# Patient Record
Sex: FEMALE | HISPANIC OR LATINO | URBAN - METROPOLITAN AREA
[De-identification: names, ages, dates, MRNs, and addresses within clinical notes are randomized per-mention and may not be internally consistent; named-entity substitution may affect disease eponyms.]

---

## 2019-06-12 ENCOUNTER — APPOINTMENT (RX ONLY)
Dept: URBAN - METROPOLITAN AREA CLINIC 37 | Facility: CLINIC | Age: 54
Setting detail: DERMATOLOGY
End: 2019-06-12

## 2019-06-12 DIAGNOSIS — L65.8 OTHER SPECIFIED NONSCARRING HAIR LOSS: ICD-10-CM

## 2019-06-12 DIAGNOSIS — L91.8 OTHER HYPERTROPHIC DISORDERS OF THE SKIN: ICD-10-CM

## 2019-06-12 DIAGNOSIS — D22 MELANOCYTIC NEVI: ICD-10-CM

## 2019-06-12 DIAGNOSIS — L82.1 OTHER SEBORRHEIC KERATOSIS: ICD-10-CM

## 2019-06-12 PROBLEM — D22.5 MELANOCYTIC NEVI OF TRUNK: Status: ACTIVE | Noted: 2019-06-12

## 2019-06-12 PROCEDURE — 99203 OFFICE O/P NEW LOW 30 MIN: CPT

## 2020-12-02 ENCOUNTER — APPOINTMENT (RX ONLY)
Dept: URBAN - METROPOLITAN AREA CLINIC 37 | Facility: CLINIC | Age: 55
Setting detail: DERMATOLOGY
End: 2020-12-02

## 2020-12-02 DIAGNOSIS — D18.0 HEMANGIOMA: ICD-10-CM

## 2020-12-02 DIAGNOSIS — L82.1 OTHER SEBORRHEIC KERATOSIS: ICD-10-CM

## 2020-12-02 DIAGNOSIS — L81.5 LEUKODERMA, NOT ELSEWHERE CLASSIFIED: ICD-10-CM

## 2020-12-02 DIAGNOSIS — D22 MELANOCYTIC NEVI: ICD-10-CM

## 2020-12-02 PROBLEM — D48.5 NEOPLASM OF UNCERTAIN BEHAVIOR OF SKIN: Status: ACTIVE | Noted: 2020-12-02

## 2020-12-02 PROBLEM — D22.5 MELANOCYTIC NEVI OF TRUNK: Status: ACTIVE | Noted: 2020-12-02

## 2020-12-02 PROBLEM — D18.01 HEMANGIOMA OF SKIN AND SUBCUTANEOUS TISSUE: Status: ACTIVE | Noted: 2020-12-02

## 2020-12-02 PROCEDURE — 11102 TANGNTL BX SKIN SINGLE LES: CPT

## 2020-12-02 PROCEDURE — ? COUNSELING

## 2020-12-02 PROCEDURE — ? BIOPSY BY SHAVE METHOD

## 2020-12-02 PROCEDURE — 99213 OFFICE O/P EST LOW 20 MIN: CPT | Mod: 25

## 2020-12-02 ASSESSMENT — LOCATION DETAILED DESCRIPTION DERM
LOCATION DETAILED: LEFT PROXIMAL PRETIBIAL REGION
LOCATION DETAILED: LEFT SUPERIOR MEDIAL UPPER BACK
LOCATION DETAILED: LEFT RIB CAGE
LOCATION DETAILED: RIGHT PROXIMAL PRETIBIAL REGION
LOCATION DETAILED: LEFT LATERAL TRAPEZIAL NECK
LOCATION DETAILED: RIGHT INFERIOR UPPER BACK
LOCATION DETAILED: SUBXIPHOID
LOCATION DETAILED: LEFT INFERIOR ANTERIOR NECK

## 2020-12-02 ASSESSMENT — LOCATION SIMPLE DESCRIPTION DERM
LOCATION SIMPLE: POSTERIOR NECK
LOCATION SIMPLE: LEFT UPPER BACK
LOCATION SIMPLE: ABDOMEN
LOCATION SIMPLE: LEFT ANTERIOR NECK
LOCATION SIMPLE: RIGHT UPPER BACK
LOCATION SIMPLE: RIGHT PRETIBIAL REGION
LOCATION SIMPLE: LEFT PRETIBIAL REGION

## 2020-12-02 ASSESSMENT — LOCATION ZONE DERM
LOCATION ZONE: TRUNK
LOCATION ZONE: NECK
LOCATION ZONE: LEG

## 2023-08-09 ENCOUNTER — APPOINTMENT (RX ONLY)
Dept: URBAN - METROPOLITAN AREA CLINIC 37 | Facility: CLINIC | Age: 58
Setting detail: DERMATOLOGY
End: 2023-08-09

## 2023-08-09 DIAGNOSIS — L82.1 OTHER SEBORRHEIC KERATOSIS: ICD-10-CM

## 2023-08-09 DIAGNOSIS — L81.4 OTHER MELANIN HYPERPIGMENTATION: ICD-10-CM

## 2023-08-09 DIAGNOSIS — L91.8 OTHER HYPERTROPHIC DISORDERS OF THE SKIN: ICD-10-CM

## 2023-08-09 PROCEDURE — ? DEFER

## 2023-08-09 PROCEDURE — ? COUNSELING

## 2023-08-09 PROCEDURE — 99213 OFFICE O/P EST LOW 20 MIN: CPT

## 2023-08-09 ASSESSMENT — LOCATION DETAILED DESCRIPTION DERM
LOCATION DETAILED: RIGHT CLAVICULAR NECK
LOCATION DETAILED: LEFT CLAVICULAR SKIN
LOCATION DETAILED: LEFT LATERAL TRAPEZIAL NECK
LOCATION DETAILED: LEFT POSTERIOR NECK
LOCATION DETAILED: RIGHT LATERAL TRAPEZIAL NECK

## 2023-08-09 ASSESSMENT — LOCATION SIMPLE DESCRIPTION DERM
LOCATION SIMPLE: RIGHT ANTERIOR NECK
LOCATION SIMPLE: POSTERIOR NECK
LOCATION SIMPLE: LEFT CLAVICULAR SKIN

## 2023-08-09 ASSESSMENT — LOCATION ZONE DERM
LOCATION ZONE: NECK
LOCATION ZONE: TRUNK

## 2023-08-09 NOTE — PROCEDURE: DEFER
Instructions (Optional): 50 skin tags per visit. LMX cream before visit
Introduction Text (Please End With A Colon): The following procedure was deferred:
Detail Level: Detailed
X Size Of Lesion In Cm (Optional): 0

## 2023-08-17 ENCOUNTER — APPOINTMENT (RX ONLY)
Dept: URBAN - METROPOLITAN AREA CLINIC 37 | Facility: CLINIC | Age: 58
Setting detail: DERMATOLOGY
End: 2023-08-17

## 2023-08-17 PROCEDURE — ? REASON FOR LEAVING

## 2023-08-24 ENCOUNTER — APPOINTMENT (RX ONLY)
Dept: URBAN - METROPOLITAN AREA CLINIC 37 | Facility: CLINIC | Age: 58
Setting detail: DERMATOLOGY
End: 2023-08-24

## 2023-08-24 DIAGNOSIS — L91.8 OTHER HYPERTROPHIC DISORDERS OF THE SKIN: ICD-10-CM

## 2023-08-24 PROCEDURE — ? BENIGN DESTRUCTION COSMETIC MULTI

## 2023-08-24 PROCEDURE — ? COUNSELING

## 2023-08-24 ASSESSMENT — LOCATION SIMPLE DESCRIPTION DERM
LOCATION SIMPLE: LEFT ANTERIOR NECK
LOCATION SIMPLE: LEFT BREAST
LOCATION SIMPLE: RIGHT BREAST

## 2023-08-24 ASSESSMENT — LOCATION ZONE DERM
LOCATION ZONE: NECK
LOCATION ZONE: TRUNK

## 2023-08-24 ASSESSMENT — LOCATION DETAILED DESCRIPTION DERM
LOCATION DETAILED: LEFT MEDIAL BREAST 7-8:00 REGION
LOCATION DETAILED: RIGHT MEDIAL BREAST 4-5:00 REGION
LOCATION DETAILED: LEFT INFERIOR ANTERIOR NECK

## 2023-08-24 NOTE — PROCEDURE: BENIGN DESTRUCTION COSMETIC MULTI
Total Number Of Lesions Treated: 5
Detail Level: Zone
Price (Use Numbers Only, No Special Characters Or $): 400
Consent: The patient's consent was obtained including but not limited to risks of crusting, scabbing, blistering, scarring, darker or lighter pigmentary change, recurrence, incomplete removal and infection.
Post-Care Instructions: I reviewed with the patient in detail post-care instructions. Patient is to wear sunprotection, and avoid picking at any of the treated lesions. Pt may apply Vaseline to crusted or scabbing areas.
Anesthesia Volume In Cc: 0.5